# Patient Record
Sex: MALE | Race: BLACK OR AFRICAN AMERICAN | NOT HISPANIC OR LATINO | URBAN - METROPOLITAN AREA
[De-identification: names, ages, dates, MRNs, and addresses within clinical notes are randomized per-mention and may not be internally consistent; named-entity substitution may affect disease eponyms.]

---

## 2017-03-28 ENCOUNTER — IMPORTED ENCOUNTER (OUTPATIENT)
Dept: URBAN - METROPOLITAN AREA CLINIC 50 | Facility: CLINIC | Age: 72
End: 2017-03-28

## 2017-03-30 ENCOUNTER — IMPORTED ENCOUNTER (OUTPATIENT)
Dept: URBAN - METROPOLITAN AREA CLINIC 50 | Facility: CLINIC | Age: 72
End: 2017-03-30

## 2017-05-25 ENCOUNTER — IMPORTED ENCOUNTER (OUTPATIENT)
Dept: URBAN - METROPOLITAN AREA CLINIC 50 | Facility: CLINIC | Age: 72
End: 2017-05-25

## 2017-07-27 ENCOUNTER — IMPORTED ENCOUNTER (OUTPATIENT)
Dept: URBAN - METROPOLITAN AREA CLINIC 50 | Facility: CLINIC | Age: 72
End: 2017-07-27

## 2017-11-30 ENCOUNTER — IMPORTED ENCOUNTER (OUTPATIENT)
Dept: URBAN - METROPOLITAN AREA CLINIC 50 | Facility: CLINIC | Age: 72
End: 2017-11-30

## 2018-03-26 ENCOUNTER — IMPORTED ENCOUNTER (OUTPATIENT)
Dept: URBAN - METROPOLITAN AREA CLINIC 50 | Facility: CLINIC | Age: 73
End: 2018-03-26

## 2018-04-05 ENCOUNTER — IMPORTED ENCOUNTER (OUTPATIENT)
Dept: URBAN - METROPOLITAN AREA CLINIC 50 | Facility: CLINIC | Age: 73
End: 2018-04-05

## 2018-08-09 ENCOUNTER — IMPORTED ENCOUNTER (OUTPATIENT)
Dept: URBAN - METROPOLITAN AREA CLINIC 50 | Facility: CLINIC | Age: 73
End: 2018-08-09

## 2018-08-28 ENCOUNTER — IMPORTED ENCOUNTER (OUTPATIENT)
Dept: URBAN - METROPOLITAN AREA CLINIC 50 | Facility: CLINIC | Age: 73
End: 2018-08-28

## 2018-12-13 ENCOUNTER — IMPORTED ENCOUNTER (OUTPATIENT)
Dept: URBAN - METROPOLITAN AREA CLINIC 50 | Facility: CLINIC | Age: 73
End: 2018-12-13

## 2019-04-11 ENCOUNTER — IMPORTED ENCOUNTER (OUTPATIENT)
Dept: URBAN - METROPOLITAN AREA CLINIC 50 | Facility: CLINIC | Age: 74
End: 2019-04-11

## 2019-04-19 ENCOUNTER — IMPORTED ENCOUNTER (OUTPATIENT)
Dept: URBAN - METROPOLITAN AREA CLINIC 50 | Facility: CLINIC | Age: 74
End: 2019-04-19

## 2019-08-15 ENCOUNTER — IMPORTED ENCOUNTER (OUTPATIENT)
Dept: URBAN - METROPOLITAN AREA CLINIC 50 | Facility: CLINIC | Age: 74
End: 2019-08-15

## 2019-09-04 ENCOUNTER — IMPORTED ENCOUNTER (OUTPATIENT)
Dept: URBAN - METROPOLITAN AREA CLINIC 50 | Facility: CLINIC | Age: 74
End: 2019-09-04

## 2019-12-27 ENCOUNTER — IMPORTED ENCOUNTER (OUTPATIENT)
Dept: URBAN - METROPOLITAN AREA CLINIC 50 | Facility: CLINIC | Age: 74
End: 2019-12-27

## 2020-01-16 ENCOUNTER — IMPORTED ENCOUNTER (OUTPATIENT)
Dept: URBAN - METROPOLITAN AREA CLINIC 50 | Facility: CLINIC | Age: 75
End: 2020-01-16

## 2020-01-28 ENCOUNTER — IMPORTED ENCOUNTER (OUTPATIENT)
Dept: URBAN - METROPOLITAN AREA CLINIC 50 | Facility: CLINIC | Age: 75
End: 2020-01-28

## 2020-02-05 ENCOUNTER — IMPORTED ENCOUNTER (OUTPATIENT)
Dept: URBAN - METROPOLITAN AREA CLINIC 50 | Facility: CLINIC | Age: 75
End: 2020-02-05

## 2020-03-30 ENCOUNTER — IMPORTED ENCOUNTER (OUTPATIENT)
Dept: URBAN - METROPOLITAN AREA CLINIC 50 | Facility: CLINIC | Age: 75
End: 2020-03-30

## 2020-05-21 ENCOUNTER — IMPORTED ENCOUNTER (OUTPATIENT)
Dept: URBAN - METROPOLITAN AREA CLINIC 50 | Facility: CLINIC | Age: 75
End: 2020-05-21

## 2020-05-21 NOTE — PATIENT DISCUSSION
"""S/P ECP/iStent OS.  IOP controlled OU Patient Education      Take 600 mg of ibuprofen 4 times a day as needed for pain. You may try to get a massage of this side of your neck. You may try ice to the neck but then if it worsens, try heating pad. Use the lidocaine patches as directed. Take methocarbamol if the ibuprofen and lidocaine patches do not help. Pay attention to the instructions and start doing the exercises when you can. You may need physical therapy. Your doctor can order this. Follow-up with an orthopedic surgeon that I have given you if you still have pain next week that is not improving. Neck Strain: Care Instructions  Your Care Instructions    You have strained the muscles and ligaments in your neck. A sudden, awkward movement can strain the neck. This often occurs with falls or car accidents or during certain sports. Everyday activities like working on a computer or sleeping can also cause neck strain if they force you to hold your neck in an awkward position for a long time. It is common for neck pain to get worse for a day or two after an injury, but it should start to feel better after that. You may have more pain and stiffness for several days before it gets better. This is expected. It may take a few weeks or longer for it to heal completely. Good home treatment can help you get better faster and avoid future neck problems. Follow-up care is a key part of your treatment and safety. Be sure to make and go to all appointments, and call your doctor if you are having problems. It's also a good idea to know your test results and keep a list of the medicines you take. How can you care for yourself at home? · If you were given a neck brace (cervical collar) to limit neck motion, wear it as instructed for as many days as your doctor tells you to. Do not wear it longer than you were told to. Wearing a brace for too long can make neck stiffness worse and weaken the neck muscles.   · You can try using heat or ice to see if it helps.  ? Try using a heating pad on a low or medium setting for 15 to 20 minutes every 2 to 3 hours. Try a warm shower in place of one session with the heating pad. You can also buy single-use heat wraps that last up to 8 hours. ? You can also try an ice pack for 10 to 15 minutes every 2 to 3 hours. · Take pain medicines exactly as directed. ? If the doctor gave you a prescription medicine for pain, take it as prescribed. ? If you are not taking a prescription pain medicine, ask your doctor if you can take an over-the-counter medicine. · Gently rub the area to relieve pain and help with blood flow. Do not massage the area if it hurts to do so. · Do not do anything that makes the pain worse. Take it easy for a couple of days. You can do your usual activities if they do not hurt your neck or put it at risk for more stress or injury. · Try sleeping on a special neck pillow. Place it under your neck, not under your head. Placing a tightly rolled-up towel under your neck while you sleep will also work. If you use a neck pillow or rolled towel, do not use your regular pillow at the same time. · To prevent future neck pain, do exercises to stretch and strengthen your neck and back. Learn how to use good posture, safe lifting techniques, and proper body mechanics. When should you call for help? Call 911 anytime you think you may need emergency care. For example, call if:    · You are unable to move an arm or a leg at all.   [facility] your doctor now or seek immediate medical care if:    · You have new or worse symptoms in your arms, legs, chest, belly, or buttocks. Symptoms may include:  ? Numbness or tingling. ? Weakness. ? Pain.     · You lose bladder or bowel control.    Watch closely for changes in your health, and be sure to contact your doctor if:    · You are not getting better as expected. Where can you learn more? Go to http://silvio.info/.   Enter M253 in the search box to learn more about \"Neck Strain: Care Instructions. \"  Current as of: June 26, 2019  Content Version: 12.2  © 3008-8759 [employer]. Care instructions adapted under license by [employer] (which disclaims liability or warranty for this information). If you have questions about a medical condition or this instruction, always ask your healthcare professional. [address] any warranty or liability for your use of this information. Patient Education        Neck Strain or Sprain: Rehab Exercises  Introduction  Here are some examples of exercises for you to try. The exercises may be suggested for a condition or for rehabilitation. Start each exercise slowly. Ease off the exercises if you start to have pain. You will be told when to start these exercises and which ones will work best for you. How to do the exercises  Neck rotation    1. Sit in a firm chair, or stand up straight. 2. Keeping your chin level, turn your head to the right, and hold for 15 to 30 seconds. 3. Turn your head to the left and hold for 15 to 30 seconds. 4. Repeat 2 to 4 times to each side. Neck stretches    1. Look straight ahead, and tip your right ear to your right shoulder. Do not let your left shoulder rise up as you tip your head to the right. 2. Hold for 15 to 30 seconds. 3. Tilt your head to the left. Do not let your right shoulder rise up as you tip your head to the left. 4. Hold for 15 to 30 seconds. 5. Repeat 2 to 4 times to each side. Forward neck flexion    1. Sit in a firm chair, or stand up straight. 2. Bend your head forward. 3. Hold for 15 to 30 seconds. 4. Repeat 2 to 4 times. Lateral (side) bend strengthening    1. With your right hand, place your first two fingers on your right temple. 2. Start to bend your head to the side while using gentle pressure from your fingers to keep your head from bending. 3. Hold for about 6 seconds.   4. Repeat 8 to 12 times.  5. Switch hands and repeat the same exercise on your left side. Forward bend strengthening    1. Place your first two fingers of either hand on your forehead. 2. Start to bend your head forward while using gentle pressure from your fingers to keep your head from bending. 3. Hold for about 6 seconds. 4. Repeat 8 to 12 times. Neutral position strengthening    1. Using one hand, place your fingertips on the back of your head at the top of your neck. 2. Start to bend your head backward while using gentle pressure from your fingers to keep your head from bending. 3. Hold for about 6 seconds. 4. Repeat 8 to 12 times. Chin tuck    1. Lie on the floor with a rolled-up towel under your neck. Your head should be touching the floor. 2. Slowly bring your chin toward your chest.  3. Hold for a count of 6, and then relax for up to 10 seconds. 4. Repeat 8 to 12 times. Follow-up care is a key part of your treatment and safety. Be sure to make and go to all appointments, and call your doctor if you are having problems. It's also a good idea to know your test results and keep a list of the medicines you take. Where can you learn more? Go to http://yulia-amilcar.info/. Enter M679 in the search box to learn more about \"Neck Strain or Sprain: Rehab Exercises. \"  Current as of: June 26, 2019  Content Version: 12.2  © 9990-0603 [employer]. Care instructions adapted under license by [employer] (which disclaims liability or warranty for this information). If you have questions about a medical condition or this instruction, always ask your healthcare professional. Vlad Lunas any warranty or liability for your use of this information.

## 2020-06-22 ENCOUNTER — IMPORTED ENCOUNTER (OUTPATIENT)
Dept: URBAN - METROPOLITAN AREA CLINIC 50 | Facility: CLINIC | Age: 75
End: 2020-06-22

## 2020-09-24 ENCOUNTER — IMPORTED ENCOUNTER (OUTPATIENT)
Dept: URBAN - METROPOLITAN AREA CLINIC 50 | Facility: CLINIC | Age: 75
End: 2020-09-24

## 2020-09-24 NOTE — PATIENT DISCUSSION
"""S/P ECP/iStent OS. IOP controlled OU with current drop therapy.  Continue Cosopt left eye twice a ""

## 2021-01-21 ENCOUNTER — IMPORTED ENCOUNTER (OUTPATIENT)
Dept: URBAN - METROPOLITAN AREA CLINIC 50 | Facility: CLINIC | Age: 76
End: 2021-01-21

## 2021-04-17 ASSESSMENT — TONOMETRY
OS_IOP_MMHG: 12
OS_IOP_MMHG: 10
OS_IOP_MMHG: 13
OD_IOP_MMHG: 27
OD_IOP_MMHG: 29
OS_IOP_MMHG: 14
OD_IOP_MMHG: 14
OD_IOP_MMHG: 15
OS_IOP_MMHG: 12
OS_IOP_MMHG: 13
OD_IOP_MMHG: 14
OD_IOP_MMHG: 17
OS_IOP_MMHG: 11
OD_IOP_MMHG: 12
OD_IOP_MMHG: 11
OS_IOP_MMHG: 10
OS_IOP_MMHG: 14
OD_IOP_MMHG: 24
OD_IOP_MMHG: 12
OD_IOP_MMHG: 14
OD_IOP_MMHG: 11
OS_IOP_MMHG: 11
OS_IOP_MMHG: 11
OD_IOP_MMHG: 15
OS_IOP_MMHG: 13
OS_IOP_MMHG: 12
OD_IOP_MMHG: 18
OD_IOP_MMHG: 17
OD_IOP_MMHG: 17
OD_IOP_MMHG: 25
OD_IOP_MMHG: 18
OS_IOP_MMHG: 12
OS_IOP_MMHG: 10
OD_IOP_MMHG: 15
OS_IOP_MMHG: 14
OS_IOP_MMHG: 12
OS_IOP_MMHG: 10
OD_IOP_MMHG: 14
OS_IOP_MMHG: 12
OD_IOP_MMHG: 14
OS_IOP_MMHG: 12
OD_IOP_MMHG: 20
OS_IOP_MMHG: 9
OD_IOP_MMHG: 26
OS_IOP_MMHG: 12
OS_IOP_MMHG: 06

## 2021-04-17 ASSESSMENT — PACHYMETRY
OS_CT_UM: 496
OD_CT_UM: 491
OS_CT_UM: 496
OD_CT_UM: 491
OS_CT_UM: 496
OD_CT_UM: 491
OS_CT_UM: 496
OD_CT_UM: 491
OD_CT_UM: 491
OS_CT_UM: 496
OD_CT_UM: 491
OS_CT_UM: 496
OD_CT_UM: 491
OD_CT_UM: 491
OS_CT_UM: 496
OD_CT_UM: 491
OS_CT_UM: 496
OD_CT_UM: 491
OD_CT_UM: 491
OS_CT_UM: 496

## 2021-04-17 ASSESSMENT — VISUAL ACUITY
OS_CC: 20/20-
OS_CC: 20/20
OD_CC: J4@ 16 IN
OS_BAT: 20/40
OS_OTHER: 20/40. 20/100.
OS_CC: 20/20
OS_SC: 20/50-
OS_CC: J4@ 16 IN
OD_CC: J1@ 17 IN
OS_OTHER: >20/400. >20/400.
OS_CC: 20/25
OS_SC: 20/30-2
OS_CC: 20/20-1
OS_CC: 20/20-2
OS_BAT: >20/400
OS_CC: 20/20-2
OS_CC: 20/25-2
OS_CC: 20/40
OS_CC: 20/20
OD_SC: 20/400
OS_CC: J1@ 17 IN

## 2021-05-19 ENCOUNTER — PREPPED CHART (OUTPATIENT)
Dept: URBAN - METROPOLITAN AREA CLINIC 52 | Facility: CLINIC | Age: 76
End: 2021-05-19

## 2021-05-27 ENCOUNTER — 4 MONTH FOLLOW-UP (OUTPATIENT)
Dept: URBAN - METROPOLITAN AREA CLINIC 52 | Facility: CLINIC | Age: 76
End: 2021-05-27

## 2021-05-27 DIAGNOSIS — H25.11: ICD-10-CM

## 2021-05-27 DIAGNOSIS — H40.1133: ICD-10-CM

## 2021-05-27 PROCEDURE — 92012 INTRM OPH EXAM EST PATIENT: CPT

## 2021-05-27 ASSESSMENT — TONOMETRY
OD_IOP_MMHG: 21
OS_IOP_MMHG: 11
OS_IOP_MMHG: 09
OD_IOP_MMHG: 18

## 2021-05-27 ASSESSMENT — VISUAL ACUITY
OU_CC: 20/25-2
OD_CC: HM @ 2FT
OS_CC: 20/25-2

## 2021-06-18 ENCOUNTER — DIAGNOSTICS ONLY (OUTPATIENT)
Dept: URBAN - METROPOLITAN AREA CLINIC 52 | Facility: CLINIC | Age: 76
End: 2021-06-18

## 2021-06-18 DIAGNOSIS — H40.1133: ICD-10-CM

## 2021-06-18 PROCEDURE — 92083 EXTENDED VISUAL FIELD XM: CPT

## 2021-06-18 PROCEDURE — 92133 CPTRZD OPH DX IMG PST SGM ON: CPT

## 2021-09-23 ENCOUNTER — 6 MONTH COMPREHENSIVE EXAM (OUTPATIENT)
Dept: URBAN - METROPOLITAN AREA CLINIC 52 | Facility: CLINIC | Age: 76
End: 2021-09-23

## 2021-09-23 DIAGNOSIS — H43.813: ICD-10-CM

## 2021-09-23 DIAGNOSIS — H25.11: ICD-10-CM

## 2021-09-23 DIAGNOSIS — H40.1133: ICD-10-CM

## 2021-09-23 PROCEDURE — 92014 COMPRE OPH EXAM EST PT 1/>: CPT

## 2021-09-23 ASSESSMENT — TONOMETRY
OS_IOP_MMHG: 11
OD_IOP_MMHG: 22
OS_IOP_MMHG: 13
OD_IOP_MMHG: 19

## 2021-09-23 ASSESSMENT — VISUAL ACUITY
OS_GLARE: 20/40
OS_SC: 20/30
OS_GLARE: 20/80
OD_SC: HM @ 1FT

## 2022-08-17 ENCOUNTER — COMPREHENSIVE EXAM (OUTPATIENT)
Dept: URBAN - METROPOLITAN AREA CLINIC 52 | Facility: CLINIC | Age: 77
End: 2022-08-17

## 2022-08-17 DIAGNOSIS — H40.1133: ICD-10-CM

## 2022-08-17 PROCEDURE — 92014 COMPRE OPH EXAM EST PT 1/>: CPT

## 2022-08-17 RX ORDER — LATANOPROST 50 UG/ML: 1 SOLUTION/ DROPS OPHTHALMIC EVERY EVENING

## 2022-08-17 RX ORDER — DORZOLAMIDE HYDROCHLORIDE TIMOLOL MALEATE 20; 5 MG/ML; MG/ML: 1 SOLUTION/ DROPS OPHTHALMIC TWICE A DAY

## 2022-08-17 ASSESSMENT — TONOMETRY
OD_IOP_MMHG: 25
OD_IOP_MMHG: 22
OS_IOP_MMHG: 10
OS_IOP_MMHG: 08

## 2022-08-17 ASSESSMENT — VISUAL ACUITY: OS_CC: 20/30-2

## 2022-08-17 NOTE — PATIENT DISCUSSION
Will order HVF 30-2 fast given patients history. Will see him back next available for the testing and then in 4 months for IOP check.

## 2022-08-17 NOTE — PATIENT DISCUSSION
IOP is elevated in the right eye but he is not experiencing any pain. Explained that the reason we keep him on a pressure lowering drop on the right eye is to keep him comfortable. If he experiences pain some of the doctors would want to consider enucleation of the right eye. Will continue current drop regimen and schedule him for testing on the left eye.

## 2022-12-14 ENCOUNTER — FOLLOW UP (OUTPATIENT)
Dept: URBAN - METROPOLITAN AREA CLINIC 52 | Facility: CLINIC | Age: 77
End: 2022-12-14

## 2022-12-14 PROCEDURE — 92012 INTRM OPH EXAM EST PATIENT: CPT

## 2022-12-14 ASSESSMENT — TONOMETRY
OD_IOP_MMHG: 24
OS_IOP_MMHG: 07
OS_IOP_MMHG: 09
OD_IOP_MMHG: 21

## 2022-12-14 ASSESSMENT — VISUAL ACUITY
OS_PH: 20/30
OS_SC: 20/40-1

## 2023-11-29 ENCOUNTER — COMPREHENSIVE EXAM (OUTPATIENT)
Dept: URBAN - METROPOLITAN AREA CLINIC 52 | Facility: CLINIC | Age: 78
End: 2023-11-29

## 2023-11-29 DIAGNOSIS — H25.11: ICD-10-CM

## 2023-11-29 DIAGNOSIS — H52.4: ICD-10-CM

## 2023-11-29 DIAGNOSIS — H40.1133: ICD-10-CM

## 2023-11-29 PROCEDURE — 92133 CPTRZD OPH DX IMG PST SGM ON: CPT

## 2023-11-29 PROCEDURE — 92083 EXTENDED VISUAL FIELD XM: CPT

## 2023-11-29 PROCEDURE — 92014 COMPRE OPH EXAM EST PT 1/>: CPT

## 2023-11-29 PROCEDURE — 92015 DETERMINE REFRACTIVE STATE: CPT

## 2023-11-29 ASSESSMENT — KERATOMETRY
OS_K1POWER_DIOPTERS: 41.50
OS_AXISANGLE2_DEGREES: 176
OS_AXISANGLE_DEGREES: 86
OD_K1POWER_DIOPTERS: 41.25
OD_K2POWER_DIOPTERS: 43.00
OD_AXISANGLE2_DEGREES: 5
OD_AXISANGLE_DEGREES: 95
OS_K2POWER_DIOPTERS: 42.25

## 2023-11-29 ASSESSMENT — VISUAL ACUITY
OS_PH: 20/30-2
OS_GLARE: 20/30-2
OS_SC: 20/40
OS_GLARE: 20/50
OU_SC: J10

## 2023-11-29 ASSESSMENT — TONOMETRY
OS_IOP_MMHG: 13
OD_IOP_MMHG: 23
OS_IOP_MMHG: 11
OD_IOP_MMHG: 20

## 2024-02-28 NOTE — PATIENT DISCUSSION
"""Informed patient that their cataract(s) are not visually significant or do not meet the criteria for cataract surgery.  Recommended attention to common cataract symptoms
"""S/P ECP/iStent OS.  IOP controlled OU
ANKUSH Liz

## 2024-06-25 ENCOUNTER — FOLLOW UP (OUTPATIENT)
Dept: URBAN - METROPOLITAN AREA CLINIC 52 | Facility: CLINIC | Age: 79
End: 2024-06-25

## 2024-06-25 DIAGNOSIS — H40.1133: ICD-10-CM

## 2024-06-25 DIAGNOSIS — H25.11: ICD-10-CM

## 2024-06-25 PROCEDURE — 92133 CPTRZD OPH DX IMG PST SGM ON: CPT

## 2024-06-25 PROCEDURE — 92083 EXTENDED VISUAL FIELD XM: CPT

## 2024-06-25 PROCEDURE — 99213 OFFICE O/P EST LOW 20 MIN: CPT

## 2024-06-25 ASSESSMENT — TONOMETRY
OS_IOP_MMHG: 14
OD_IOP_MMHG: 31
OS_IOP_MMHG: 16
OD_IOP_MMHG: 28

## 2024-06-25 ASSESSMENT — VISUAL ACUITY: OS_CC: 20/25-1

## 2025-01-06 ENCOUNTER — DIAGNOSTICS ONLY (OUTPATIENT)
Age: 80
End: 2025-01-06

## 2025-01-06 DIAGNOSIS — H40.1133: ICD-10-CM

## 2025-01-06 PROCEDURE — 92133 CPTRZD OPH DX IMG PST SGM ON: CPT

## 2025-01-06 PROCEDURE — 92083 EXTENDED VISUAL FIELD XM: CPT

## 2025-01-09 ENCOUNTER — COMPREHENSIVE EXAM (OUTPATIENT)
Age: 80
End: 2025-01-09

## 2025-01-09 DIAGNOSIS — H40.1133: ICD-10-CM

## 2025-01-09 DIAGNOSIS — Z96.1: ICD-10-CM

## 2025-01-09 DIAGNOSIS — H25.11: ICD-10-CM

## 2025-01-09 DIAGNOSIS — H52.4: ICD-10-CM

## 2025-01-09 DIAGNOSIS — H43.813: ICD-10-CM

## 2025-01-09 PROCEDURE — 99214 OFFICE O/P EST MOD 30 MIN: CPT

## 2025-01-09 PROCEDURE — 92015 DETERMINE REFRACTIVE STATE: CPT
